# Patient Record
Sex: FEMALE | URBAN - METROPOLITAN AREA
[De-identification: names, ages, dates, MRNs, and addresses within clinical notes are randomized per-mention and may not be internally consistent; named-entity substitution may affect disease eponyms.]

---

## 2022-02-17 ENCOUNTER — LAB OUTSIDE AN ENCOUNTER (OUTPATIENT)
Dept: URBAN - METROPOLITAN AREA CLINIC 107 | Facility: CLINIC | Age: 47
End: 2022-02-17

## 2022-02-17 ENCOUNTER — OFFICE VISIT (OUTPATIENT)
Dept: URBAN - METROPOLITAN AREA CLINIC 107 | Facility: CLINIC | Age: 47
End: 2022-02-17
Payer: COMMERCIAL

## 2022-02-17 VITALS
BODY MASS INDEX: 30.22 KG/M2 | WEIGHT: 164.2 LBS | HEIGHT: 62 IN | DIASTOLIC BLOOD PRESSURE: 89 MMHG | SYSTOLIC BLOOD PRESSURE: 123 MMHG | HEART RATE: 102 BPM | TEMPERATURE: 98.4 F

## 2022-02-17 DIAGNOSIS — K92.1 HEMATOCHEZIA: ICD-10-CM

## 2022-02-17 PROCEDURE — 99204 OFFICE O/P NEW MOD 45 MIN: CPT | Performed by: INTERNAL MEDICINE

## 2022-02-17 RX ORDER — POLYETHYLENE GLYCOL 3350, SODIUM CHLORIDE, SODIUM BICARBONATE, POTASSIUM CHLORIDE 420; 11.2; 5.72; 1.48 G/4L; G/4L; G/4L; G/4L
AS DIRECTED POWDER, FOR SOLUTION ORAL ONCE
Qty: 1 KIT | Refills: 0 | OUTPATIENT
Start: 2022-02-17 | End: 2022-02-18

## 2022-02-17 NOTE — HPI-TODAY'S VISIT:
Ms. Casillas is a 46-year-old woman with no chronic medical problems presenting for evaluation of intermittent episodes of lower abdominal cramping and red blood per rectum. She recalls getting back in July 2019 having lower abdominal cramping pain followed by bowel movements containing red blood.  She reports that the episodes would self resolve and then recur intermittently over the next year.  He did not seem to be in any obvious triggering factor.  Her bowel movements are generally regular and fairly normal consistency.  She reports that she will see the red blood on the tissue paper and on the stool.  There is no family history of inflammatory bowel disease, colon polyps, colon cancer.  She has never undergone a colonoscopy.  She also denies any weight loss, NSAID use, abdominal pain.  No nausea, vomiting, heartburn or difficulty swallowing. Labs on 2/9/2022 show WBC 6.6, hemoglobin 14.3, MCV 92, platelets 285, normal basic metabolic panel, normal liver function tests, vitamin D 25-hydroxy low at 24, TSH 2.97, vitamin B12 426.  Hemoglobin is normal.

## 2022-03-22 ENCOUNTER — TELEPHONE ENCOUNTER (OUTPATIENT)
Dept: URBAN - METROPOLITAN AREA CLINIC 113 | Facility: CLINIC | Age: 47
End: 2022-03-22

## 2022-03-22 RX ORDER — POLYETHYLENE GLYCOL 3350, SODIUM CHLORIDE, SODIUM BICARBONATE, POTASSIUM CHLORIDE 420; 11.2; 5.72; 1.48 G/4L; G/4L; G/4L; G/4L
AS DIRECTED POWDER, FOR SOLUTION ORAL ONCE
Qty: 420 GM | Refills: 0 | OUTPATIENT
Start: 2022-03-22 | End: 2022-03-23

## 2022-03-31 ENCOUNTER — CLAIMS CREATED FROM THE CLAIM WINDOW (OUTPATIENT)
Dept: URBAN - METROPOLITAN AREA CLINIC 4 | Facility: CLINIC | Age: 47
End: 2022-03-31
Payer: COMMERCIAL

## 2022-03-31 ENCOUNTER — OFFICE VISIT (OUTPATIENT)
Dept: URBAN - METROPOLITAN AREA SURGERY CENTER 25 | Facility: SURGERY CENTER | Age: 47
End: 2022-03-31
Payer: COMMERCIAL

## 2022-03-31 DIAGNOSIS — D12.5 BENIGN NEOPLASM OF SIGMOID COLON: ICD-10-CM

## 2022-03-31 DIAGNOSIS — D12.5 ADENOMA OF SIGMOID COLON: ICD-10-CM

## 2022-03-31 DIAGNOSIS — Q43.8 REDUNDANT COLON: ICD-10-CM

## 2022-03-31 DIAGNOSIS — K92.1 HEMATOCHEZIA: ICD-10-CM

## 2022-03-31 PROCEDURE — G8907 PT DOC NO EVENTS ON DISCHARG: HCPCS | Performed by: INTERNAL MEDICINE

## 2022-03-31 PROCEDURE — 88305 TISSUE EXAM BY PATHOLOGIST: CPT | Performed by: PATHOLOGY

## 2022-03-31 PROCEDURE — 45385 COLONOSCOPY W/LESION REMOVAL: CPT | Performed by: INTERNAL MEDICINE

## 2022-04-21 ENCOUNTER — DASHBOARD ENCOUNTERS (OUTPATIENT)
Age: 47
End: 2022-04-21

## 2022-04-21 ENCOUNTER — OFFICE VISIT (OUTPATIENT)
Dept: URBAN - METROPOLITAN AREA CLINIC 107 | Facility: CLINIC | Age: 47
End: 2022-04-21

## 2022-04-21 ENCOUNTER — OFFICE VISIT (OUTPATIENT)
Dept: URBAN - METROPOLITAN AREA CLINIC 107 | Facility: CLINIC | Age: 47
End: 2022-04-21
Payer: COMMERCIAL

## 2022-04-21 VITALS — HEIGHT: 62 IN | RESPIRATION RATE: 18 BRPM | TEMPERATURE: 97.2 F | WEIGHT: 170 LBS | BODY MASS INDEX: 31.28 KG/M2

## 2022-04-21 DIAGNOSIS — K92.1 HEMATOCHEZIA: ICD-10-CM

## 2022-04-21 DIAGNOSIS — H93.12 TINNITUS OF LEFT EAR: ICD-10-CM

## 2022-04-21 DIAGNOSIS — Z86.010 HX OF ADENOMATOUS COLONIC POLYPS: ICD-10-CM

## 2022-04-21 PROCEDURE — 99213 OFFICE O/P EST LOW 20 MIN: CPT | Performed by: PHYSICIAN ASSISTANT

## 2022-04-21 NOTE — HPI-TODAY'S VISIT:
Ms. Casillas is a 47-year-old woman with no chronic medical conditions, presenting for follow-up after undergoing colonoscopy for evaluation of hematochezia. She was last seen on 2/17/2022 with complaints of lower abdominal cramping and red blood per rectum.  Symptoms were thought be related to hemorrhoidal bleeding, but patient agreed to undergo colonoscopy to exclude significant colorectal pathology. Colonoscopy (3/31/2022): Emerson bowel preparation scale score of 9. One 12 mm polyp in the sigmoid colon.  Diverticulosis of the sigmoid colon.  Redundant sigmoid colon.  Nonbleeding internal hemorrhoids, grade 1.  Pathology revealed this to be a tubular adenoma.  A repeat colonoscopy is recommended in 3 years as part of colon cancer prevention.  She states that she is doing overall well from a GI perspective.  Her bowel habits are regular and normal consistency.  No red blood per rectum, melena or hematochezia.  She denies any issues with heartburn or acid reflux.  No difficulty swallowing or regurgitation.  She denies any abdominal pain, nausea, vomiting, fevers or chills. Her primary complaint today is persistent tinnitus.  She states that she has had a brain MRI in the past which was reportedly unremarkable.  She apparently has been seen by neurology and ENT in the past whose evaluations have been unremarkable.  She is seeking a second opinion.  She apparently has an appointment with neurology in the next several weeks.  This is quite distressing to her.  She denies intercede use, increased caffeine intake as well as increased salt intake.  She denies any headaches, vision changes or episodes of vertigo.  No syncope.

## 2022-04-22 ENCOUNTER — WEB ENCOUNTER (OUTPATIENT)
Dept: URBAN - METROPOLITAN AREA CLINIC 107 | Facility: CLINIC | Age: 47
End: 2022-04-22

## 2022-04-28 ENCOUNTER — WEB ENCOUNTER (OUTPATIENT)
Dept: URBAN - METROPOLITAN AREA CLINIC 107 | Facility: CLINIC | Age: 47
End: 2022-04-28

## 2022-04-29 ENCOUNTER — WEB ENCOUNTER (OUTPATIENT)
Dept: URBAN - METROPOLITAN AREA CLINIC 107 | Facility: CLINIC | Age: 47
End: 2022-04-29

## 2022-04-29 ENCOUNTER — TELEPHONE ENCOUNTER (OUTPATIENT)
Dept: URBAN - METROPOLITAN AREA CLINIC 107 | Facility: CLINIC | Age: 47
End: 2022-04-29

## 2022-05-02 PROBLEM — 429047008: Status: ACTIVE | Noted: 2022-05-02

## 2022-05-02 PROBLEM — 4831000119102: Status: ACTIVE | Noted: 2022-05-02

## 2022-05-02 PROBLEM — 60862001: Status: ACTIVE | Noted: 2022-05-02
